# Patient Record
Sex: FEMALE | Race: ASIAN | NOT HISPANIC OR LATINO | URBAN - METROPOLITAN AREA
[De-identification: names, ages, dates, MRNs, and addresses within clinical notes are randomized per-mention and may not be internally consistent; named-entity substitution may affect disease eponyms.]

---

## 2017-01-10 ENCOUNTER — IMPORTED ENCOUNTER (OUTPATIENT)
Dept: URBAN - METROPOLITAN AREA CLINIC 1 | Facility: CLINIC | Age: 72
End: 2017-01-10

## 2017-01-10 PROBLEM — Z96.1: Noted: 2017-01-10

## 2017-01-10 PROBLEM — H16.143: Noted: 2017-01-10

## 2017-01-10 PROBLEM — H04.123: Noted: 2017-01-10

## 2017-01-10 PROBLEM — H10.45: Noted: 2017-01-10

## 2017-01-10 PROBLEM — H25.812: Noted: 2017-01-10

## 2017-01-10 PROCEDURE — 92012 INTRM OPH EXAM EST PATIENT: CPT

## 2017-01-10 NOTE — PATIENT DISCUSSION
1.  SHERIE w/ PEK OU- (H/o Cautery LLs OU) Cont Restasis BID OU Cont ATs QID OU Routinely Cont Genteal Gel radhika QHS OU. 2.  Allergic Conjunctivitis OU : Cont Alrex BID OU (Disp 10mL bottle per pt request written rx given) Cont Pataday Qdaily OU. Refills for all meds sent to patient's Pharm 3. Cataract OS: Observe for now without intervention. The patient was advised to contact us if any change or worsening of vision4. Pseudophakia OD - (Standard) 5. H/o DM w/o DR OD DM w/ Mild NPDR OS Return for an appointment in July 30 with Dr. Ellyn Shah.

## 2017-07-28 ENCOUNTER — IMPORTED ENCOUNTER (OUTPATIENT)
Dept: URBAN - METROPOLITAN AREA CLINIC 1 | Facility: CLINIC | Age: 72
End: 2017-07-28

## 2017-07-28 PROBLEM — H04.123: Noted: 2017-07-28

## 2017-07-28 PROBLEM — E11.9: Noted: 2017-07-28

## 2017-07-28 PROBLEM — Z96.1: Noted: 2017-07-28

## 2017-07-28 PROBLEM — Z79.84: Noted: 2017-07-28

## 2017-07-28 PROBLEM — E11.3292: Noted: 2017-07-28

## 2017-07-28 PROBLEM — H25.812: Noted: 2017-07-28

## 2017-07-28 PROBLEM — H16.143: Noted: 2017-07-28

## 2017-07-28 PROBLEM — H10.45: Noted: 2017-07-28

## 2017-07-28 PROCEDURE — 92014 COMPRE OPH EXAM EST PT 1/>: CPT

## 2017-07-28 NOTE — PATIENT DISCUSSION
1.  DM Type II (Oral Meds)  without sign of diabetic retinopathy and no blot heme on dilated retinal examination today OD No Macular Edema:  Discussed the pathophysiology of diabetes and its effect on the eye and risk of blindness. Stressed the importance of strong glucose control. Advised of importance of at least yearly dilated examinations but to contact us immediately for any problems or concerns. 2.  DM Type II (Oral Meds) with mild Nonproliferative Diabetic Retinopathy OS No Macular Edema:  Discussed the pathophysiology of diabetes and its effect on the eye and risk of blindness. Stressed the importance of strong glucose control. Advised of importance of at least yearly dilated examinations but to contact us immediately for any problems or concerns. 3. SHERIE w/ PEK OU- (H/o Cautery LLs OU) Cont Restasis BID OU Cont ATs TID OU Routinely. 4.  Cataract OS: Observe for now without intervention. The patient was advised to contact us if any change or worsening of vision5. Pseudophakia OD  6. Allergic Conjunctivitis OU - Cont Alrex BID OU Cont Pataday Qdaily (Refills for patient's gtts given today paper rx's given per pt request) Letter to PCP Return for an appointment in 1 yr 27 with Dr. Claudeen Cobble.

## 2017-08-11 ENCOUNTER — HOSPITAL ENCOUNTER (OUTPATIENT)
Dept: LAB | Age: 72
Discharge: HOME | End: 2017-08-11
Payer: MEDICARE

## 2017-08-11 PROCEDURE — 88305 TISSUE EXAM BY PATHOLOGIST: CPT | Performed by: PLASTIC SURGERY

## 2017-08-24 ENCOUNTER — HOSPITAL ENCOUNTER (OUTPATIENT)
Dept: LAB | Age: 72
Discharge: HOME | End: 2017-08-24
Payer: MEDICARE

## 2017-08-24 PROCEDURE — 88331 PATH CONSLTJ SURG 1 BLK 1SPC: CPT | Performed by: PLASTIC SURGERY

## 2017-08-24 PROCEDURE — 88305 TISSUE EXAM BY PATHOLOGIST: CPT | Performed by: PLASTIC SURGERY

## 2017-08-24 PROCEDURE — 88332 PATH CONSLTJ SURG EA ADD BLK: CPT | Performed by: PLASTIC SURGERY

## 2017-12-12 ENCOUNTER — HOSPITAL ENCOUNTER (OUTPATIENT)
Dept: LAB | Age: 72
Discharge: HOME | End: 2017-12-12
Payer: MEDICARE

## 2017-12-12 PROCEDURE — 88305 TISSUE EXAM BY PATHOLOGIST: CPT | Performed by: PLASTIC SURGERY

## 2018-01-16 ENCOUNTER — HOSPITAL ENCOUNTER (OUTPATIENT)
Dept: ULTRASOUND IMAGING | Age: 73
Discharge: HOME | End: 2018-01-16
Payer: MEDICARE

## 2018-01-16 ENCOUNTER — HOSPITAL ENCOUNTER (OUTPATIENT)
Dept: LAB | Age: 73
Discharge: HOME | End: 2018-01-16
Payer: MEDICARE

## 2018-01-16 DIAGNOSIS — K76.6 PORTAL HYPERTENSION (HCC): ICD-10-CM

## 2018-01-16 LAB
ALBUMIN SERPL-MCNC: 4.6 G/DL (ref 3.4–5)
ALBUMIN/GLOB SERPL: 1.2 {RATIO} (ref 0.8–1.7)
ALP SERPL-CCNC: 60 U/L (ref 45–117)
ALT SERPL-CCNC: 32 U/L (ref 13–56)
ANION GAP SERPL CALC-SCNC: 7 MMOL/L (ref 3–18)
AST SERPL-CCNC: 42 U/L (ref 15–37)
BILIRUB SERPL-MCNC: 1.1 MG/DL (ref 0.2–1)
BUN SERPL-MCNC: 19 MG/DL (ref 7–18)
BUN/CREAT SERPL: 22 (ref 12–20)
CALCIUM SERPL-MCNC: 9.4 MG/DL (ref 8.5–10.1)
CHLORIDE SERPL-SCNC: 102 MMOL/L (ref 100–108)
CO2 SERPL-SCNC: 30 MMOL/L (ref 21–32)
CREAT SERPL-MCNC: 0.88 MG/DL (ref 0.6–1.3)
ERYTHROCYTE [DISTWIDTH] IN BLOOD BY AUTOMATED COUNT: 17 % (ref 11.6–14.5)
GLOBULIN SER CALC-MCNC: 3.7 G/DL (ref 2–4)
GLUCOSE SERPL-MCNC: 109 MG/DL (ref 74–99)
HCT VFR BLD AUTO: 40.8 % (ref 35–45)
HGB BLD-MCNC: 12.9 G/DL (ref 12–16)
INR PPP: 1.3 (ref 0.8–1.2)
MCH RBC QN AUTO: 25.1 PG (ref 24–34)
MCHC RBC AUTO-ENTMCNC: 31.6 G/DL (ref 31–37)
MCV RBC AUTO: 79.5 FL (ref 74–97)
PLATELET # BLD AUTO: 125 K/UL (ref 135–420)
PMV BLD AUTO: 10.9 FL (ref 9.2–11.8)
POTASSIUM SERPL-SCNC: 3.7 MMOL/L (ref 3.5–5.5)
PROT SERPL-MCNC: 8.3 G/DL (ref 6.4–8.2)
PROTHROMBIN TIME: 15.4 SEC (ref 11.5–15.2)
RBC # BLD AUTO: 5.13 M/UL (ref 4.2–5.3)
SODIUM SERPL-SCNC: 139 MMOL/L (ref 136–145)
WBC # BLD AUTO: 4 K/UL (ref 4.6–13.2)

## 2018-01-16 PROCEDURE — 80053 COMPREHEN METABOLIC PANEL: CPT

## 2018-01-16 PROCEDURE — 86704 HEP B CORE ANTIBODY TOTAL: CPT

## 2018-01-16 PROCEDURE — 85610 PROTHROMBIN TIME: CPT

## 2018-01-16 PROCEDURE — 76705 ECHO EXAM OF ABDOMEN: CPT

## 2018-01-16 PROCEDURE — 86708 HEPATITIS A ANTIBODY: CPT

## 2018-01-16 PROCEDURE — 36415 COLL VENOUS BLD VENIPUNCTURE: CPT

## 2018-01-16 PROCEDURE — 87340 HEPATITIS B SURFACE AG IA: CPT

## 2018-01-16 PROCEDURE — 85027 COMPLETE CBC AUTOMATED: CPT

## 2018-01-16 PROCEDURE — 86706 HEP B SURFACE ANTIBODY: CPT

## 2018-01-17 LAB
HAV AB SER QL IA: POSITIVE
HBV CORE AB SERPL QL IA: NEGATIVE
HBV SURFACE AB SER QL IA: NEGATIVE
HBV SURFACE AB SERPL IA-ACNC: 7.79 MIU/ML
HBV SURFACE AG SER QL: <0.1 INDEX
HBV SURFACE AG SER QL: NEGATIVE
HEP BS AB COMMENT,HBSAC: ABNORMAL

## 2018-07-12 ENCOUNTER — IMPORTED ENCOUNTER (OUTPATIENT)
Dept: URBAN - METROPOLITAN AREA CLINIC 1 | Facility: CLINIC | Age: 73
End: 2018-07-12

## 2018-07-12 PROBLEM — E11.9: Noted: 2018-07-12

## 2018-07-12 PROBLEM — H16.143: Noted: 2018-07-12

## 2018-07-12 PROBLEM — Z79.84: Noted: 2018-07-12

## 2018-07-12 PROBLEM — H04.123: Noted: 2018-07-12

## 2018-07-12 PROBLEM — H10.45: Noted: 2018-07-12

## 2018-07-12 PROBLEM — H25.812: Noted: 2018-07-12

## 2018-07-12 PROBLEM — Z96.1: Noted: 2018-07-12

## 2018-07-12 PROCEDURE — 92014 COMPRE OPH EXAM EST PT 1/>: CPT

## 2018-07-12 NOTE — PATIENT DISCUSSION
1.  DM Type II (Oral Medication) without sign of diabetic retinopathy and no blot heme on dilated retinal examination today OU No Macular Edema:  Discussed the pathophysiology of diabetes and its effect on the eye and risk of blindness. Stressed the importance of strong glucose control. Advised of importance of at least yearly dilated examinations but to contact us immediately for any problems or concerns. 2. SHERIE w/ PEK OU- (H/o Cautery LLs OU) Cont Restasis BID OU Cont ATs TID OU Routinely. 3.  Allergic Conjunctivitis OU :  Condition discussed with patient. Cont Alrex BID OU Cont Pataday Qdaily4. Cataract OS: Observe for now without intervention. The patient was advised to contact us if any change or worsening of vision5. Pseudophakia OD - (Standard)Return for an appointment in 6 months 10/DFE/glare with Dr. Kristie Spears.

## 2018-07-12 NOTE — PATIENT DISCUSSION
Cataract OS: Observe for now without intervention.  The patient was advised to contact us if any change or worsening of vision

## 2018-12-20 ENCOUNTER — IMPORTED ENCOUNTER (OUTPATIENT)
Dept: URBAN - METROPOLITAN AREA CLINIC 1 | Facility: CLINIC | Age: 73
End: 2018-12-20

## 2018-12-20 PROBLEM — Z96.1: Noted: 2018-12-20

## 2018-12-20 PROBLEM — H16.143: Noted: 2018-12-20

## 2018-12-20 PROBLEM — H04.123: Noted: 2018-12-20

## 2018-12-20 PROBLEM — H10.45: Noted: 2018-12-20

## 2018-12-20 PROBLEM — H25.812: Noted: 2018-12-20

## 2018-12-20 PROBLEM — Z79.84: Noted: 2018-12-20

## 2018-12-20 PROBLEM — E11.9: Noted: 2018-12-20

## 2018-12-20 PROCEDURE — 92012 INTRM OPH EXAM EST PATIENT: CPT

## 2018-12-20 PROCEDURE — 92015 DETERMINE REFRACTIVE STATE: CPT

## 2018-12-20 NOTE — PATIENT DISCUSSION
1.  Cataract OS: Visually Significant secondary to glare discussed the risks benefits alternatives and limitations of cataract surgery. The patient stated a full understanding and a desire to proceed with the procedure. The patient will need to return for preop appointment with cataract measurements and to have any additional questions answered and start pre-operative eye drops as directed. Phaco PCL OS Otherwise follow-up 6 months 30/glare 2. DM Type II (Oral Medication) without sign of diabetic retinopathy and no blot heme on dilated retinal examination today OU No Macular Edema:  Discussed the pathophysiology of diabetes and its effect on the eye and risk of blindness. Stressed the importance of strong glucose control. Advised of importance of at least yearly dilated examinations but to contact us immediately for any problems or concerns. 3. SHERIE w/ PEK OU- (H/o Cautery LLs OU) Cont Restasis BID OU Cont ATs TID OU Routinely. 4.  Allergic Conjunctivitis OU -- The condition was  discussed with the patient. Cont Alrex BID OU Cont Pataday Qdaily (written rx given) 5. Pseudophakia OD - (Standard)Return for an appointment for Ascan/H and P. with Dr. Rylee Dominguez.

## 2019-01-07 ENCOUNTER — IMPORTED ENCOUNTER (OUTPATIENT)
Dept: URBAN - METROPOLITAN AREA CLINIC 1 | Facility: CLINIC | Age: 74
End: 2019-01-07

## 2019-01-07 PROBLEM — H25.812: Noted: 2019-01-07

## 2019-01-07 PROCEDURE — 92136 OPHTHALMIC BIOMETRY: CPT

## 2019-01-07 NOTE — PATIENT DISCUSSION
Cataract OS: Visually Significant secondary to glare discussed the risks benefits alternatives and limitations of cataract surgery. The patient stated a full understanding and a desire to proceed with the procedure. The patient will need to start pre-operative eye drops as directed. Proceed w/ phaco PCL OSPt understands they will need glasses post-op to achieve their best corrected vision. Return for an appointment for sx OS with Dr. Ghislaine Boss.

## 2019-01-16 ENCOUNTER — IMPORTED ENCOUNTER (OUTPATIENT)
Dept: URBAN - METROPOLITAN AREA CLINIC 1 | Facility: CLINIC | Age: 74
End: 2019-01-16

## 2019-01-16 PROBLEM — H25.812 COMBINED FORM OF SENILE CATARACT OF LEFT EYE: Status: ACTIVE | Noted: 2019-01-16

## 2019-01-16 PROBLEM — H25.812 COMBINED FORM OF SENILE CATARACT OF LEFT EYE: Status: RESOLVED | Noted: 2019-01-16 | Resolved: 2019-01-16

## 2019-01-17 ENCOUNTER — IMPORTED ENCOUNTER (OUTPATIENT)
Dept: URBAN - METROPOLITAN AREA CLINIC 1 | Facility: CLINIC | Age: 74
End: 2019-01-17

## 2019-01-17 PROBLEM — Z96.1: Noted: 2019-01-17

## 2019-01-17 PROCEDURE — 99024 POSTOP FOLLOW-UP VISIT: CPT

## 2019-02-05 ENCOUNTER — IMPORTED ENCOUNTER (OUTPATIENT)
Dept: URBAN - METROPOLITAN AREA CLINIC 1 | Facility: CLINIC | Age: 74
End: 2019-02-05

## 2019-02-05 PROBLEM — Z96.1: Noted: 2019-02-05

## 2019-02-05 PROCEDURE — 99024 POSTOP FOLLOW-UP VISIT: CPT

## 2019-02-05 NOTE — PATIENT DISCUSSION
POW#3 Phaco/ PCL OS (Standard) Doing well Finish PO meds per schedule MRX for glasses given Return for an appointment in July 30 with Dr. Chase Juarez.

## 2019-07-25 ENCOUNTER — IMPORTED ENCOUNTER (OUTPATIENT)
Dept: URBAN - METROPOLITAN AREA CLINIC 1 | Facility: CLINIC | Age: 74
End: 2019-07-25

## 2019-07-25 PROBLEM — H16.143: Noted: 2019-07-25

## 2019-07-25 PROBLEM — H04.123: Noted: 2019-07-25

## 2019-07-25 PROBLEM — Z96.1: Noted: 2019-07-25

## 2019-07-25 PROBLEM — H10.45: Noted: 2019-07-25

## 2019-07-25 PROBLEM — Z79.84: Noted: 2019-07-25

## 2019-07-25 PROBLEM — H26.493: Noted: 2019-07-25

## 2019-07-25 PROBLEM — E11.9: Noted: 2019-07-25

## 2019-07-25 PROCEDURE — 92014 COMPRE OPH EXAM EST PT 1/>: CPT

## 2019-07-25 NOTE — PATIENT DISCUSSION
1.  DM Type II (Oral Medication) without sign of diabetic retinopathy and no blot heme on dilated retinal examination today OU No Macular Edema:  Discussed the pathophysiology of diabetes and its effect on the eye and risk of blindness. Stressed the importance of strong glucose control. Advised of importance of at least yearly dilated examinations but to contact us immediately for any problems or concerns. 2. SHERIE w/ PEK OU- (LL cauterized OU) Recommend PF ATs Q3H OU routinely and AT Gel QHS OU. Cont Restasis BID OU. 3.  Allergic Conjunctivitis OU -- The condition was  discussed with the patient. Avoidance of allergens and cool compresses were recommended. Use Pazeo QAM OU (erx). Patient failed trial of OTC Zaditor and generic brand4. PCO OU: (Posterior Capsule Opacification)   Observe and consider yag cap when pt feels pco visually significant and visual acuity decreases to appropriate level. 5. Pseudophakia OU - (Standard OU)  Patient deferred Manifest Rx today. Return for an appointment in 6 months 10 with Dr. Marycruz Miller.

## 2019-07-31 ENCOUNTER — HOSPITAL ENCOUNTER (OUTPATIENT)
Dept: CT IMAGING | Age: 74
Discharge: HOME | End: 2019-07-31
Payer: MEDICARE

## 2019-07-31 DIAGNOSIS — K76.6 PORTAL HYPERTENSION (HCC): ICD-10-CM

## 2019-07-31 LAB — CREAT UR-MCNC: 0.8 MG/DL (ref 0.6–1.3)

## 2019-07-31 PROCEDURE — 74011636320 HC RX REV CODE- 636/320: Performed by: RADIOLOGY

## 2019-07-31 PROCEDURE — 74170 CT ABD WO CNTRST FLWD CNTRST: CPT

## 2019-07-31 PROCEDURE — 82565 ASSAY OF CREATININE: CPT

## 2019-07-31 RX ADMIN — IOPAMIDOL 98 ML: 612 INJECTION, SOLUTION INTRAVENOUS at 12:00

## 2020-01-30 ENCOUNTER — IMPORTED ENCOUNTER (OUTPATIENT)
Dept: URBAN - METROPOLITAN AREA CLINIC 1 | Facility: CLINIC | Age: 75
End: 2020-01-30

## 2020-01-30 PROBLEM — H04.123: Noted: 2020-01-30

## 2020-01-30 PROBLEM — H16.143: Noted: 2020-01-30

## 2020-01-30 PROCEDURE — 92012 INTRM OPH EXAM EST PATIENT: CPT

## 2020-01-30 NOTE — PATIENT DISCUSSION
1.  SHERIE w/ improved PEK OU- (H/o Cautery RLL LLL) Cont Restasis BID OU Cont PF ATs Q3hrs OU w/a. Cont AT Gel radhika QHS OU. 2.  H/o DM w/o DR OU 3. Allergic Conjunctivitis OU -- Controlled on Pazeo. Cont Pazeo Qdaily OU. 4.  PCO OU: (Posterior Capsule Opacification) Observe. 5.  Pseudophakia OU - (Standard OU) Return for an appointment in 6 mo 27 with Dr. Kristie Spears.

## 2021-05-28 ENCOUNTER — IMPORTED ENCOUNTER (OUTPATIENT)
Dept: URBAN - METROPOLITAN AREA CLINIC 1 | Facility: CLINIC | Age: 76
End: 2021-05-28

## 2021-05-28 PROBLEM — H16.143: Noted: 2021-05-28

## 2021-05-28 PROBLEM — Z79.84: Noted: 2021-05-28

## 2021-05-28 PROBLEM — H04.123: Noted: 2021-05-28

## 2021-05-28 PROBLEM — E11.9: Noted: 2021-05-28

## 2021-05-28 PROCEDURE — 99214 OFFICE O/P EST MOD 30 MIN: CPT

## 2021-05-28 NOTE — PATIENT DISCUSSION
1.  DM Type II (Oral Med) without sign of diabetic retinopathy and no blot heme on dilated retinal examination today OU No Macular Edema:  Discussed the pathophysiology of diabetes and its effect on the eye and risk of blindness. Stressed the importance of strong glucose control. Advised of importance of at least yearly dilated examinations but to contact us immediately for any problems or concerns. 2. SHERIE w/ PEK OU -- Increased PEK w/ partial compliance. Cont Restasis BID OU (written Rx given)  PF ATs QID OU w/a. Cont AT Gel radhika QHS OU. (Unable to tolerate Xiidra sample). 3.  Allergic Conjunctivitis OU -- Controlled on Pazeo. Cont Pazeo Qdaily OU. 4.  PCO OU -- (Posterior Capsule Opacification) Observe. 5.  Pseudophakia OU -- (Standard OU)Return for an appointment in 6 months for a 10/glare and k check with Dr. Chase Juarez.

## 2021-07-20 ENCOUNTER — HOSPITAL ENCOUNTER (OUTPATIENT)
Dept: LAB | Age: 76
Discharge: HOME | End: 2021-07-20
Payer: MEDICARE

## 2021-07-20 PROCEDURE — 88305 TISSUE EXAM BY PATHOLOGIST: CPT

## 2021-08-25 NOTE — PATIENT DISCUSSION
New Prescription: ofloxacin (ofloxacin): drops: 0.3% 1 drop four times a day as directed into affected eye 08-

## 2021-08-25 NOTE — PATIENT DISCUSSION
New Prescription: prednisolone acetate (prednisolone acetate): drops,suspension: 1% 1 drop four times a day as directed into affected eye 08-

## 2021-09-20 NOTE — PATIENT DISCUSSION
1 WEEK POST-OP PREMIUM , OS-Stop Ocuflox, Continue Acular and  Pred-forte 1 drop 4 times daily for 1 month. Also given patient instruction sheet.

## 2022-04-02 ASSESSMENT — TONOMETRY
OS_IOP_MMHG: 17
OS_IOP_MMHG: 15
OD_IOP_MMHG: 14
OD_IOP_MMHG: 15
OD_IOP_MMHG: 16
OS_IOP_MMHG: 15
OS_IOP_MMHG: 16
OS_IOP_MMHG: 12
OD_IOP_MMHG: 16
OS_IOP_MMHG: 12
OD_IOP_MMHG: 12
OS_IOP_MMHG: 18
OD_IOP_MMHG: 12
OS_IOP_MMHG: 14
OD_IOP_MMHG: 13

## 2022-04-02 ASSESSMENT — KERATOMETRY
OD_K1POWER_DIOPTERS: 45.50
OD_AXISANGLE2_DEGREES: 097
OD_AXISANGLE_DEGREES: 007
OS_AXISANGLE_DEGREES: 004
OD_K2POWER_DIOPTERS: 43.25
OS_AXISANGLE2_DEGREES: 094
OS_K2POWER_DIOPTERS: 42.75
OS_K1POWER_DIOPTERS: 44.75

## 2022-04-02 ASSESSMENT — VISUAL ACUITY
OS_SC: 20/25
OD_SC: 20/20
OS_SC: 20/20
OS_SC: 20/20
OS_CC: J1+
OD_CC: J1+
OS_SC: 20/25
OD_SC: 20/20
OS_CC: 20/30
OS_CC: J1+
OD_GLARE: 20/50
OS_GLARE: 20/80
OS_SC: 20/30
OD_CC: J1+
OD_CC: J1+
OD_SC: 20/20
OS_CC: J1+
OD_GLARE: 20/50
OS_SC: 20/20
OD_SC: 20/20
OS_SC: 20/20
OS_GLARE: 20/400
OS_CC: 20/25-2
OD_SC: 20/20
OD_SC: 20/20
OD_SC: 20/25
OS_GLARE: 20/100

## 2022-09-08 NOTE — PATIENT DISCUSSION
Moderate Dry Eyes: Prescribed disappearing preservative or preservative-free artificial tears 4-6x per day ou.  Return for follow-up as scheduled or sooner if symptoms persist.

## 2022-09-08 NOTE — PROCEDURE NOTE: CLINICAL
PROCEDURE NOTE: YAG Capsulotomy OD. Diagnosis: Posterior Capsular Opacity. Anesthesia: Topical. Prior to commencing surgery patient identification, surgical procedure, site, and side were confirmed by Dr. Wanda Larson. Following topical proparacaine anesthesia, the patient was positioned at the YAG laser, a contact lens coupled to the cornea with methylcellulose and an axial posterior performed capsulotomy without complication using 14 Mj x 2.7. Excess methylcellulose was washed from the eye. One drop of Alphagan was instilled and the patient returned to the holding area having tolerated the procedure well and without complication. Carlito Lainez

## 2022-09-12 ENCOUNTER — ESTABLISHED PATIENT (OUTPATIENT)
Dept: URBAN - METROPOLITAN AREA CLINIC 1 | Facility: CLINIC | Age: 77
End: 2022-09-12

## 2022-09-12 DIAGNOSIS — Z96.1: ICD-10-CM

## 2022-09-12 DIAGNOSIS — E11.9: ICD-10-CM

## 2022-09-12 DIAGNOSIS — H26.493: ICD-10-CM

## 2022-09-12 DIAGNOSIS — H16.143: ICD-10-CM

## 2022-09-12 DIAGNOSIS — H10.13: ICD-10-CM

## 2022-09-12 DIAGNOSIS — H04.123: ICD-10-CM

## 2022-09-12 PROCEDURE — 99214 OFFICE O/P EST MOD 30 MIN: CPT

## 2022-09-12 RX ORDER — OLOPATADINE HYDROCHLORIDE 2 MG/ML: 1 SOLUTION OPHTHALMIC TWICE A DAY

## 2022-09-12 ASSESSMENT — VISUAL ACUITY
OD_BAT: 20/40
OD_CC: 20/20
OS_BAT: 20/40
OS_CC: J1+
OD_CC: J1+
OS_CC: 20/25

## 2022-09-12 ASSESSMENT — TONOMETRY
OS_IOP_MMHG: 15
OD_IOP_MMHG: 15

## 2022-09-12 NOTE — PATIENT DISCUSSION
800-1000 Calories daily TOTAL.  More than 50% of the diet should be plant based (vegetables more than fruit) with no processed foods.  Limit meat products.  If you eat meat- opt for Fish/Chicken/Turkey.  Avoid Pork and Red Meat.  Opt for water- 10- 12 glasses daily- avoid sweetened beverages altogether (No Soda/Juice/Sweetened drinks- Limit Alcohol).  Avoid fast food/fried food.   Limit breads/pasta/rice/potatoes but if you must- eat whole grain or wheat versions over white bread products- this includes rice and pastas.  150 minutes of cardiovascular exercise per week and weight train two to three times per week hitting as many muscle groups as possible.  Make sure to eat at least 50-75 grams of protein daily.    No evidence of Diabetic Retinopathy.

## 2022-09-15 NOTE — PATIENT DISCUSSION
Residual astigmatism causing blur - patient ed on possible LRI to correct to he declines as only notices blur OD when reading chart and not in normal life. OD is his sighting eye for shooting and if gets worse would consider in future.

## 2023-12-14 ENCOUNTER — FOLLOW UP (OUTPATIENT)
Dept: URBAN - METROPOLITAN AREA CLINIC 1 | Facility: CLINIC | Age: 78
End: 2023-12-14

## 2023-12-14 DIAGNOSIS — H26.493: ICD-10-CM

## 2023-12-14 DIAGNOSIS — H04.123: ICD-10-CM

## 2023-12-14 DIAGNOSIS — H16.143: ICD-10-CM

## 2023-12-14 PROCEDURE — 99213 OFFICE O/P EST LOW 20 MIN: CPT

## 2023-12-14 ASSESSMENT — VISUAL ACUITY
OD_BAT: 20/50
OD_CC: 20/20+2
OS_BAT: 20/50
OS_CC: 20/20-2

## 2023-12-14 ASSESSMENT — TONOMETRY
OS_IOP_MMHG: 14
OD_IOP_MMHG: 14

## 2024-12-20 ENCOUNTER — COMPREHENSIVE EXAM (OUTPATIENT)
Age: 79
End: 2024-12-20

## 2024-12-20 DIAGNOSIS — E11.9: ICD-10-CM

## 2024-12-20 DIAGNOSIS — H10.13: ICD-10-CM

## 2024-12-20 DIAGNOSIS — H04.123: ICD-10-CM

## 2024-12-20 DIAGNOSIS — H16.143: ICD-10-CM

## 2024-12-20 DIAGNOSIS — H35.363: ICD-10-CM

## 2024-12-20 DIAGNOSIS — H26.493: ICD-10-CM

## 2024-12-20 PROCEDURE — 99214 OFFICE O/P EST MOD 30 MIN: CPT
